# Patient Record
Sex: MALE | Race: WHITE | Employment: OTHER | URBAN - METROPOLITAN AREA
[De-identification: names, ages, dates, MRNs, and addresses within clinical notes are randomized per-mention and may not be internally consistent; named-entity substitution may affect disease eponyms.]

---

## 2017-06-01 ENCOUNTER — PRE VISIT (OUTPATIENT)
Dept: UROLOGY | Facility: CLINIC | Age: 52
End: 2017-06-01

## 2017-06-05 ENCOUNTER — OFFICE VISIT (OUTPATIENT)
Dept: UROLOGY | Facility: CLINIC | Age: 52
End: 2017-06-05
Payer: COMMERCIAL

## 2017-06-05 VITALS — WEIGHT: 174.7 LBS

## 2017-06-05 DIAGNOSIS — N50.819 EPIDIDYMAL PAIN: Primary | ICD-10-CM

## 2017-06-05 PROCEDURE — 99243 OFF/OP CNSLTJ NEW/EST LOW 30: CPT | Performed by: UROLOGY

## 2017-06-05 RX ORDER — TRAZODONE HYDROCHLORIDE 50 MG/1
50 TABLET, FILM COATED ORAL
COMMUNITY
Start: 2017-05-05

## 2017-06-05 RX ORDER — CIPROFLOXACIN 500 MG/1
TABLET, FILM COATED ORAL
Refills: 0 | COMMUNITY
Start: 2017-05-17

## 2017-06-05 RX ORDER — TAMSULOSIN HYDROCHLORIDE 0.4 MG/1
0.4 CAPSULE ORAL
COMMUNITY
Start: 2010-03-09

## 2017-06-05 RX ORDER — ATORVASTATIN CALCIUM 20 MG/1
20 TABLET, FILM COATED ORAL
COMMUNITY
Start: 2017-03-12

## 2017-06-05 RX ORDER — PANTOPRAZOLE SODIUM 20 MG/1
20 TABLET, DELAYED RELEASE ORAL DAILY
Refills: 11 | COMMUNITY
Start: 2017-05-24

## 2017-06-05 ASSESSMENT — PAIN SCALES - GENERAL: PAINLEVEL: NO PAIN (0)

## 2017-06-05 NOTE — MR AVS SNAPSHOT
Patient's Medication List   [facility] in [place]       Patient:  JENIFER HAYS   :  November 15, 1965   Physician:  Lukasz Ibarra           This is your record.  Keep this with you and show to your community pharmacist(s) and physician(s) at each visit.     Allergies:  FENTANYL - Nausea and Vomiting               Medications  Valid as of: 2017 -  2:27 PM    Generic Name Brand Name Tablet Size Instructions for use    Atorvastatin Calcium LIPITOR 20 MG Take 20 mg by mouth        Ciprofloxacin HCl CIPRO 500 MG TAKE 1 TABLET BY MOUTH TWICE DAILY FOR 14 DAYS        Pantoprazole Sodium PROTONIX 20 MG Take 20 mg by mouth daily        Tamsulosin HCl FLOMAX 0.4 MG Take 0.4 mg by mouth        TraZODone HCl DESYREL 50 MG Take 50 mg by mouth        .           .           .           .

## 2017-06-05 NOTE — NURSING NOTE
Maurice Mahan's goals for this visit include:   Chief Complaint   Patient presents with     Consult     left side testicular pain- dull ache        He requests these members of his care team be copied on today's visit information: yes    PCP: Lukasz Ibarra    Referring Provider:  Kye Burnett MD  [facility]  [address] [place] [address]    Chief Complaint   Patient presents with     Consult     left side testicular pain- dull ache        Initial Wt 79.2 kg (174 lb 11.2 oz) There is no height or weight on file to calculate BMI.  Medication Reconciliation: complete    Do you need any medication refills at today's visit? No     Amorrae JAMA Brooks

## 2017-06-05 NOTE — PROGRESS NOTES
I am seeing Maurice Mahan in consultation from Kye Burnett  for evaluation of left epididymalgia.    HPI:  Maurice Mahan is a 51 year old male with chronic left epididymal pain.    History of resolution of discomfort after Cipro.    Has improvement with Cipro, takes pretty constant x 5-6 months.  Pain comes and goes.  Worse with exercise.  Doesn't affect sexual function.    No history of vasectomy.    UA was crystal clear 2017.    Scrotal ultrasound 10/10/11  Tiny left epididymis cyst  Flow to the bilateral testicles is normal and symmetrical.    REVIEW OF SYSTEMS:  General: negative  Skin: negative  Eyes: negative  Ears/Nose/Throat: negative  Respiratory: negative  Cardiovascular: negative  Gastrointestinal: GERD after esophagus testis.  Genitourinary: see HPI  Musculoskeletal: negative  Neurologic: negative  Psychiatric: negative  Hematologic/Lymphatic/Immunologic: negative  Endocrine: negative    PAST MEDICAL HX:  Take lipitor  Flomax for BPH  History of esophageal cancer, GERD now  Insomina.  History of HSV- lip only.    PAST SURG HX:  Surgery for right torsion  esophagectomy    FAMILY HX:  Father CVA, .  Ethoic.  Mother has ANCA-vasculitis, COPD, neuropathy.    SOCIAL HX:  Social History   Substance Use Topics     Smoking status: Not on file     Smokeless tobacco: Not on file     Alcohol use Not on file     MEDICATIONS:  Current Outpatient Prescriptions   Medication Sig     atorvastatin (LIPITOR) 20 MG tablet Take 20 mg by mouth     tamsulosin (FLOMAX) 0.4 MG capsule Take 0.4 mg by mouth     traZODone (DESYREL) 50 MG tablet Take 50 mg by mouth     ciprofloxacin (CIPRO) 500 MG tablet TAKE 1 TABLET BY MOUTH TWICE DAILY FOR 14 DAYS     pantoprazole (PROTONIX) 20 MG EC tablet Take 20 mg by mouth daily     No current facility-administered medications for this visit.        ALLERGIES:  Fentanyl      GENERAL PHYSICAL EXAM:     Wt 79.2 kg (174 lb 11.2 oz)   Constitutional: No acute distress.  Well nourished.   PSYCH: normal mood and affect.  NEURO: normal gait, no focal deficits.   EYES: anicteric, EOMI, PERR.  ENT: neck supple, no lymphadenopathy, mucosae moist, no thrush.  CARDIOPULMONARY: breathing non-labored, pulse regular rate/rhythm, no peripheral edema.  GI: Abdomen soft, non-tender, overweight, no organomegaly.  MUSCULOSKELETAL: normal limb proportions, no muscle wasting, no contractures.  SKIN: Normal virilized hair distribution, no lesions, warts or rashes over genitalia, abdomen extremities or face.  HEME/LYMPH: no ecchymosis, no lymphadenopathy in groin or neck, no lymphedema.     EXAM:  Phallus normal , meatus adequate, no plaques palpated.   Left testis descended, size is normal  , consistency is normal . No intra-testicular masses.   Right testis descended, size is normal , consistency is normal . No intra-testicular masses.   Epididymes present, right is non-tender, not-enlarged. Left epididymis slightly tender to palpation.  Left cord: Vas present. no varicocele noted.  Right cord: Vas present. no varicocele noted.     Prostate exam: deferred     Imaging/labs:    ASSESSMENT:     Left epididymalgia, chronic.  Discussed this could be idiopathic, possible congestive epididymitis/ obstruction.    PLAN:    Discussed observation versus epididymectomy versus microscopic denervation of the spermatic cord, versus orchiectomy.    Pt is not bothered enough to pursue surgery.    Advised follow-up as needed.  No strong evidence of infection, but Cipro does help the discomfort.  It's not clear if this is because of infection or from the anti-inflammatory properties of Cipro.    Follow-up if pain worsens enough to want surgery.    Discussed could do Bacterial DNA analysis of semen/urine to check on treatment options for other antibiotics. But the yield from this is probably questionable.      Copied cc to Consulting provider Kye Burnett        Thank-you for the kind consultation.  Vimal Pina,  MD     Urological Surgeon

## 2017-06-05 NOTE — LETTER
2017      RE: Maurice Mahan  [address]  [place] [address]       I am seeing Maurice Mahan in consultation from Kye Burnett  for evaluation of left epididymalgia.    HPI:  Maurice Mahan is a 51 year old male with chronic left epididymal pain.    History of resolution of discomfort after Cipro.    Has improvement with Cipro, takes pretty constant x 5-6 months.  Pain comes and goes.  Worse with exercise.  Doesn't affect sexual function.    No history of vasectomy.    UA was crystal clear 2017.    REVIEW OF SYSTEMS:  General: negative  Skin: negative  Eyes: negative  Ears/Nose/Throat: negative  Respiratory: negative  Cardiovascular: negative  Gastrointestinal: GERD after esophagus testis.  Genitourinary: see HPI  Musculoskeletal: negative  Neurologic: negative  Psychiatric: negative  Hematologic/Lymphatic/Immunologic: negative  Endocrine: negative    PAST MEDICAL HX:  Take lipitor  Flomax for BPH  History of esophageal cancer, GERD now  Insomina.  History of HSV- lip only.    PAST SURG HX:  Surgery for right torsion  esophagectomy    FAMILY HX:  Father CVA, .  Ethoic.  Mother has ANCA-vasculitis, COPD, neuropathy.    SOCIAL HX:  Social History   Substance Use Topics     Smoking status: Not on file     Smokeless tobacco: Not on file     Alcohol use Not on file     MEDICATIONS:  Current Outpatient Prescriptions   Medication Sig     atorvastatin (LIPITOR) 20 MG tablet Take 20 mg by mouth     tamsulosin (FLOMAX) 0.4 MG capsule Take 0.4 mg by mouth     traZODone (DESYREL) 50 MG tablet Take 50 mg by mouth     ciprofloxacin (CIPRO) 500 MG tablet TAKE 1 TABLET BY MOUTH TWICE DAILY FOR 14 DAYS     pantoprazole (PROTONIX) 20 MG EC tablet Take 20 mg by mouth daily     No current facility-administered medications for this visit.        ALLERGIES:  Fentanyl      GENERAL PHYSICAL EXAM:     Wt 79.2 kg (174 lb 11.2 oz)   Constitutional: No acute distress. Well nourished.   PSYCH: normal  mood and affect.  NEURO: normal gait, no focal deficits.   EYES: anicteric, EOMI, PERR.  ENT: neck supple, no lymphadenopathy, mucosae moist, no thrush.  CARDIOPULMONARY: breathing non-labored, pulse regular rate/rhythm, no peripheral edema.  GI: Abdomen soft, non-tender, overweight, no organomegaly.  MUSCULOSKELETAL: normal limb proportions, no muscle wasting, no contractures.  SKIN: Normal virilized hair distribution, no lesions, warts or rashes over genitalia, abdomen extremities or face.  HEME/LYMPH: no ecchymosis, no lymphadenopathy in groin or neck, no lymphedema.     EXAM:  Phallus normal , meatus adequate, no plaques palpated.   Left testis descended, size is normal  , consistency is normal . No intra-testicular masses.   Right testis descended, size is normal , consistency is normal . No intra-testicular masses.   Epididymes present, right is non-tender, not-enlarged. Left epididymis slightly tender to palpation.  Left cord: Vas present. no varicocele noted.  Right cord: Vas present. no varicocele noted.     Prostate exam: deferred     Imaging/labs:    ASSESSMENT:     Left epididymalgia, chronic.  Discussed this could be idiopathic, possible congestive epididymitis/ obstruction.    PLAN:    Discussed observation versus epididymectomy versus microscopic denervation of the spermatic cord, versus orchiectomy.    Pt is not bothered enough to pursue surgery.    Advised follow-up as needed.  No strong evidence of infection, but Cipro does help the discomfort.  It's not clear if this is because of infection or from the anti-inflammatory properties of Cipro.    Follow-up if pain worsens enough to want surgery.    Discussed could do Bacterial DNA analysis of semen/urine to check on treatment options for other antibiotics. But the yield from this is probably questionable.      Copied cc to Consulting provider Kye Burnett        Thank-you for the kind consultation.  Vimal Pina MD     Urological  Surgeon    Vimal Pina MD

## 2017-06-05 NOTE — MR AVS SNAPSHOT
Maurice Mahan     (MR # [id number])              After Visit Summary      Visit Information     Date & Time Provider Department Encounter #    6/5/2017  4:00 PM Vimal Pina MD [facility] [id number]      Vitals  Most recent update: 6/5/2017  3:49 PM by Maricel Brooks MA    Wt                   79.2 kg (174 lb 11.2 oz)           Reason for visit     Consult left side testicular pain- dull ache     Reason for Visit History      Diagnoses        Codes Comments    Epididymal pain    -  Primary N50.819       Appointments for Next 1 Year     None      Follow-up and Disposition     Return if symptoms worsen or fail to improve.    Follow-up and Disposition History      Health Maintenance Due     Health Maintenance Due   Topic Date Due    TETANUS IMMUNIZATION (SYSTEM ASSIGNED)  11/15/1983    HEPATITIS C SCREENING  11/15/1983    LIPID SCREEN Q5 YR MALE (SYSTEM ASSIGNED)  11/15/2000    COLON CANCER SCREEN (SYSTEM ASSIGNED)  11/15/2015              IMPORTANT INFORMATION  For all doctor appointments, please bring your medications in their original containers .   For all prescription refills please contact your pharmacy directly one week prior to your last dose and request a refill. The pharmacy will then contact us.  If you have any lab tests ordered please call [phone] to schedule a lab appointment.  To contact [facility] in [facility] call [phone] / for [facility] in [facility] call [phone] / for [facility] in [facility] call [phone].    Pharmacy: [facility] [id number] [address]    * * * * * * * PATIENT'S COPY* * * * * * * * * *

## 2017-06-12 ENCOUNTER — TELEPHONE (OUTPATIENT)
Dept: UROLOGY | Facility: CLINIC | Age: 52
End: 2017-06-12

## 2017-06-12 DIAGNOSIS — N50.819 EPIDIDYMAL PAIN: Primary | ICD-10-CM

## 2017-06-12 RX ORDER — CIPROFLOXACIN 500 MG/1
500 TABLET, FILM COATED ORAL 2 TIMES DAILY
Qty: 28 TABLET | Refills: 0 | Status: SHIPPED | OUTPATIENT
Start: 2017-06-12 | End: 2017-06-26

## 2017-06-12 NOTE — TELEPHONE ENCOUNTER
"Discussed message below with Dr. Pina who recommends for patient to stop the cipro for now and if symptoms persist, patient should follow up in clinic to discuss other options.     Called and spoke to patient who is aware of the above information. Patient stated, \"I have been off of the cipro now for 4 days and I feel like it helps some, but not completely.\" Patient requesting for a refill of cipro to be sent to [facility] in [place] if possible. Patient will call with any questions.    Discussed with Dr. Pina and refill for ciprofloxacin 500 mg PO BID for 2 weeks received. Prescription sent to [facility] in [place] per patient request.      Verna Suarez RN, BSN  -[facility]  Urology/General Surgery      "

## 2017-06-12 NOTE — TELEPHONE ENCOUNTER
Received voicemail from patient requesting a different antibiotic for epididymitis. Patient requested call back to discuss further.    Returned call to patient and left generic voicemail requesting a return call back to clinic.     Verna Suarez RN, BSN  [facility]  Urology/General Surgery

## 2017-07-05 ENCOUNTER — TELEPHONE (OUTPATIENT)
Dept: UROLOGY | Facility: CLINIC | Age: 52
End: 2017-07-05

## 2017-07-05 NOTE — TELEPHONE ENCOUNTER
Received a fax from [facility] requesting a refill on Cipro per Dr. Oilver last documentation:     Dr. Pina who recommends for patient to stop the cipro for now and if symptoms persist, patient should follow up in clinic to discuss other options.     Pt was informed of this . Pt states he is unsure when he will be able to be seen in clinic and will call back when he is available.

## 2018-04-19 ENCOUNTER — RECORDS - HEALTHEAST (OUTPATIENT)
Dept: ADMINISTRATIVE | Facility: OTHER | Age: 53
End: 2018-04-19

## 2018-04-20 ENCOUNTER — HOSPITAL ENCOUNTER (OUTPATIENT)
Dept: INTERVENTIONAL RADIOLOGY/VASCULAR | Facility: HOSPITAL | Age: 53
Setting detail: RADIATION/ONCOLOGY SERIES
Discharge: STILL ADMITTED OR ADMINISTRATIVE | End: 2018-04-20

## 2018-04-20 DIAGNOSIS — C15.9 ESOPHAGEAL CANCER (H): ICD-10-CM

## 2018-04-20 LAB
HGB BLD-MCNC: 14.2 G/DL (ref 14–18)
INR PPP: 0.93 (ref 0.9–1.1)
PLATELET # BLD AUTO: 346 THOU/UL (ref 140–440)

## 2018-04-20 ASSESSMENT — MIFFLIN-ST. JEOR: SCORE: 1648.65

## 2018-06-20 ASSESSMENT — MIFFLIN-ST. JEOR: SCORE: 1604.2

## 2018-06-22 ENCOUNTER — ANESTHESIA - HEALTHEAST (OUTPATIENT)
Dept: SURGERY | Facility: HOSPITAL | Age: 53
End: 2018-06-22

## 2018-06-22 ENCOUNTER — SURGERY - HEALTHEAST (OUTPATIENT)
Dept: SURGERY | Facility: HOSPITAL | Age: 53
End: 2018-06-22

## 2018-07-06 ENCOUNTER — OFFICE VISIT - HEALTHEAST (OUTPATIENT)
Dept: SURGERY | Facility: CLINIC | Age: 53
End: 2018-07-06

## 2018-07-06 DIAGNOSIS — Z48.89 POSTOPERATIVE VISIT: ICD-10-CM

## 2018-08-07 ENCOUNTER — RECORDS - HEALTHEAST (OUTPATIENT)
Dept: ADMINISTRATIVE | Facility: OTHER | Age: 53
End: 2018-08-07

## 2018-08-08 ENCOUNTER — HOSPITAL ENCOUNTER (OUTPATIENT)
Dept: ULTRASOUND IMAGING | Facility: HOSPITAL | Age: 53
Discharge: HOME | End: 2018-08-08
Attending: INTERNAL MEDICINE | Admitting: RADIOLOGY

## 2018-08-08 DIAGNOSIS — C15.3: ICD-10-CM

## 2018-08-11 LAB
BACTERIA SPEC CULT: NO GROWTH
GRAM STAIN RESULT: NORMAL
GRAM STAIN RESULT: NORMAL

## 2018-09-19 ENCOUNTER — OFFICE VISIT - HEALTHEAST (OUTPATIENT)
Dept: SURGERY | Facility: CLINIC | Age: 53
End: 2018-09-19

## 2018-09-19 DIAGNOSIS — C15.9 MALIGNANT NEOPLASM OF ESOPHAGUS, UNSPECIFIED LOCATION (H): ICD-10-CM

## 2018-09-19 ASSESSMENT — MIFFLIN-ST. JEOR: SCORE: 1501.69

## 2018-11-28 ENCOUNTER — OFFICE VISIT - HEALTHEAST (OUTPATIENT)
Dept: SURGERY | Facility: CLINIC | Age: 53
End: 2018-11-28

## 2018-11-28 DIAGNOSIS — C15.5 MALIGNANT NEOPLASM OF LOWER THIRD OF ESOPHAGUS (H): ICD-10-CM

## 2018-11-28 ASSESSMENT — MIFFLIN-ST. JEOR: SCORE: 1548.86

## 2019-01-01 ENCOUNTER — HOSPITAL ENCOUNTER (OUTPATIENT)
Dept: RADIOLOGY | Facility: HOSPITAL | Age: 54
Discharge: HOME | End: 2019-08-28
Attending: INTERNAL MEDICINE

## 2019-01-01 ENCOUNTER — RECORDS - HEALTHEAST (OUTPATIENT)
Dept: ADMINISTRATIVE | Facility: OTHER | Age: 54
End: 2019-01-01

## 2019-01-01 ENCOUNTER — TRANSFERRED RECORDS (OUTPATIENT)
Dept: HEALTH INFORMATION MANAGEMENT | Facility: CLINIC | Age: 54
End: 2019-01-01

## 2019-01-01 ENCOUNTER — HEALTH MAINTENANCE LETTER (OUTPATIENT)
Age: 54
End: 2019-01-01

## 2019-01-01 DIAGNOSIS — C15.9 ESOPHAGEAL CANCER (H): ICD-10-CM

## 2020-01-01 ENCOUNTER — VIRTUAL VISIT (OUTPATIENT)
Dept: ONCOLOGY | Facility: CLINIC | Age: 55
End: 2020-01-01
Attending: PHYSICIAN ASSISTANT
Payer: COMMERCIAL

## 2020-01-01 ENCOUNTER — PRE VISIT (OUTPATIENT)
Dept: ONCOLOGY | Facility: CLINIC | Age: 55
End: 2020-01-01

## 2020-01-01 ENCOUNTER — ONCOLOGY VISIT (OUTPATIENT)
Dept: ONCOLOGY | Facility: CLINIC | Age: 55
End: 2020-01-01
Attending: INTERNAL MEDICINE
Payer: COMMERCIAL

## 2020-01-01 VITALS
SYSTOLIC BLOOD PRESSURE: 110 MMHG | OXYGEN SATURATION: 100 % | HEART RATE: 93 BPM | DIASTOLIC BLOOD PRESSURE: 73 MMHG | TEMPERATURE: 98 F | WEIGHT: 145.1 LBS

## 2020-01-01 DIAGNOSIS — C15.5 MALIGNANT NEOPLASM OF LOWER THIRD OF ESOPHAGUS (H): Primary | ICD-10-CM

## 2020-01-01 DIAGNOSIS — C16.0 GASTROESOPHAGEAL CANCER (H): Primary | ICD-10-CM

## 2020-01-01 DIAGNOSIS — M89.8X9 MALIGNANT BONE PAIN: ICD-10-CM

## 2020-01-01 DIAGNOSIS — G89.3 MALIGNANT BONE PAIN: ICD-10-CM

## 2020-01-01 DIAGNOSIS — R64 CACHEXIA (H): ICD-10-CM

## 2020-01-01 DIAGNOSIS — C79.51 BONE METASTASIS: ICD-10-CM

## 2020-01-01 LAB
COPATH REPORT: NORMAL
LAB AP CHARGES (HE HISTORICAL CONVERSION): ABNORMAL
LAB MED GENERAL PATH INTERP (HE HISTORICAL CONVERSION): ABNORMAL
PATH REPORT.ADDENDUM SPEC: ABNORMAL
PATH REPORT.COMMENTS IMP SPEC: ABNORMAL
PATH REPORT.FINAL DX SPEC: ABNORMAL
PATH REPORT.MICROSCOPIC SPEC OTHER STN: ABNORMAL
PATH REPORT.RELEVANT HX SPEC: ABNORMAL
RESULT FLAG (HE HISTORICAL CONVERSION): ABNORMAL
SPECIMEN DESCRIPTION: ABNORMAL

## 2020-01-01 PROCEDURE — 99215 OFFICE O/P EST HI 40 MIN: CPT | Mod: 95 | Performed by: INTERNAL MEDICINE

## 2020-01-01 PROCEDURE — 40000114 ZZH STATISTIC NO CHARGE CLINIC VISIT

## 2020-01-01 PROCEDURE — 00000346 ZZHCL STATISTIC REVIEW OUTSIDE SLIDES TC 88321: Performed by: INTERNAL MEDICINE

## 2020-01-01 PROCEDURE — 99204 OFFICE O/P NEW MOD 45 MIN: CPT | Mod: ZP | Performed by: INTERNAL MEDICINE

## 2020-01-01 RX ORDER — METOPROLOL SUCCINATE 25 MG/1
25 TABLET, EXTENDED RELEASE ORAL
COMMUNITY
Start: 2018-10-12

## 2020-01-01 RX ORDER — FERROUS SULFATE 324(65)MG
324 TABLET, DELAYED RELEASE (ENTERIC COATED) ORAL
COMMUNITY

## 2020-01-01 RX ORDER — METOPROLOL SUCCINATE 25 MG/1
TABLET, EXTENDED RELEASE ORAL
COMMUNITY
Start: 2019-04-17

## 2020-01-01 RX ORDER — LORAZEPAM 1 MG/1
TABLET ORAL
COMMUNITY
Start: 2019-01-01

## 2020-01-01 RX ORDER — SENNOSIDES A AND B 8.6 MG/1
8.6 TABLET, FILM COATED ORAL
COMMUNITY
Start: 2019-01-01

## 2020-01-01 ASSESSMENT — PAIN SCALES - GENERAL: PAINLEVEL: MILD PAIN (2)

## 2020-01-10 NOTE — TELEPHONE ENCOUNTER
RECORDS STATUS - ALL OTHER DIAGNOSIS      Action    Action Taken January 10, 2020  LVM for PT to CB and verify records Hx.  PT needs to sign and GEORGE for [facility] and authorize us to view [facility] in [place].  January 13, 2020  Signed GEORGE received from PT and faxed with request for records to [facility]  January 28, 2020  IB from Dr Cook requesting last two office notes from Dr. Sanchez.  Call to Sweetie at [facility] and she is faxing over ASAP     RECORDS RECEIV[facility]: [facility], [facility], [facility], [facility], [facility],/[facility]   DATE RECEIVED: Care Everywhere/Requested 1/13/20   NOTES STATUS DETAILS   OFFICE NOTE from referring provider     OFFICE NOTE from medical oncologist Received 1/13/20   297 pages  [facility]   DISCHARGE SUMMARY from hospital     DISCHARGE REPORT from the ER     OPERATIVE REPORT     MEDICATION LIST     CLINICAL TRIAL TREATMENTS TO DATE     LABS Received 1/13/20    PATHOLOGY REPORTS     ANYTHING RELATED TO DIAGNOSIS Slides requested 1/10/20  Received [facility] 1/15/20  Received [facility] 1/15/20  Received [facility] 1/15/20  Received [facility] 1/15/20 [facility] Case: [id number]; Case: [id number]   [facility] Case: [id number]   [facility] Case: [id number]   [facility] NR- (A)   GENONOMIC TESTING     TYPE:     IMAGING (NEED IMAGES & REPORT)     CT SCANS Requested 1/10/20  Verified in PACS 1/13/20  Requested 1/14/20  Received [facility] 1/22/20      Merged to PACS 1/15/20 [facility]    [facility]  [facility] (Sending disc per Rogers)  CDI   MRI Requested 1/10/20  Verified in PACS 1/13/20  Requested 1/14/20  Merged to PACS 1/15/20   [facility]    [facility]   [facility] whole body Requested 1/10/20  Verified in PACS 1/13/20 [facility]   ULTRASOUND     PET Requested 1/14/20  Merged to PACS 1/15/20 [facility]

## 2020-01-10 NOTE — TELEPHONE ENCOUNTER
ONCOLOGY INTAKE: Records Information      APPT INFORMATION: 1/21/20 - Joey - [facility]   Referring provider:  self referred  Referring provider s clinic:  none  Reason for visit/diagnosis:  2nd opinion esophageal cancer  Has patient been notified of appointment date and time?: yes    RECORDS INFORMATION:  Were the records received with the referral (via [employer])? no    Has patient been seen for any external appt for this diagnosis? yes    If yes, where? [facility] Pending sale to [facility], [facility], [place], [place], [place]  [facility]    Has patient had any imaging or procedures outside of [place] for this condition? yes      If Yes, where? [facility] Pending sale to [facility], [facility], [place], [place], [place]  [facility]    ADDITIONAL INFORMATION:  Scheduled via call from patient

## 2020-01-21 NOTE — LETTER
1/21/2020       RE: Maurice Mahan  [address]  [place] [address]     Dear Colleague,    Thank you for referring your patient, Maurice Mahan, to the [facility]. Please see a copy of my visit note below.    [facility]  [facility]CER [facility]    NEW PATIENT VISIT NOTE    Referring Provider: Dr. Wilson at [facility] in [place]    PATIENT NAME: Maurice Mahan MRN # [id number]  DATE OF VISIT: January 20, 2020 YOB: 1965    CANCER TYPE: Esophageal/GE junction adeno  STAGE: Metastatic. Initially [id number] in 2011  ECOG PS: 1    Cancer Staging  No matching staging information was found for the patient.    PD-L1: Pending. Was 0% on 2011 specimen  NGS:    SUMMARY    6/8/11 EUS  6/2011 PET/CT  6/9/11 [place] (Dr. Lewis at [facility])  6/13~7/11/11 Chemoradiation with cis + 5FU x 2 cycles  9/1/11 Robotic esophagectomy, jejunostomy, Complete pathologic CR  4/6/18 Esophageal anastamosis/paraesophageal node bx +fariba for adenocarcinoma. Had presented again with dysphagia. PET/CT at the time negative except for persistent paraesophageal node  5~7/16/18 Chemoradiation with cisplatin + 5FU x 4 cycles. Had feeding tube.  8/8/18 L thoracentesis 700 cc at [facility]. Cytology negative for malignancy but felt to be be malignant.   8/15/18 LLE US DVT. Enoxaparin --> rivaroxaban. Persistent in 9/2018 11/27/18 EUS  2/20/19 Bone scan showed C2 lesion  2/25/19 C2 biopsy. Adenocarcinoma, HER-2 IHC negative  4/2/19 MRI @ [facility]. C2, C4, C5, T2 lesions  4/5/19 C1-4 fusion, C2 debulking due to instability of Cspein. Path: adenocarcinoma with signet ring features. HER2 IHC negative  4/24~7/3/19 FOLFIRI x 6  8/5~??? Ramucirumab  10/30/19 Cyberknife to C5 over 3 fractions 10/30 ~ 11/1 12/7/19~current Trifluridine/tipiracil  12/4/19 T6 biopsy  12/18/19 T9 biopsy    SUBJECTIVE  Mr. Mahan is a 55 yo male who presents today for a second opinion and to consider the FATE  trial for metastatic esophageal/GE junction adenocarcinoma. He is accompanied by his wife. Currently on lonsurf since 12/7/19. Has ongoing shortness of breath that no one has been really able to explain. Has seen cardiology and pulmonary for this. No problems at rest but exercise tolerance is limited and has very low stamina. Is going to start an exercise program for people living with cancer next week. Pulmonary suspected it to be at least somewhat related to reflux and aspiration, noting that this was his opinion despite the patient sleeping upright. He has a known small left pleural effusion that has been stable and required thoracentesis 8/8/2018, but nothing since. Had some R rib pain after sex felt to perhaps be a fracture last month, resolving. Has chronic neck pain but fairly mild, but limited ROM after surgery. No numbness/tingling, fevers, chills, N/V, dysphagia. Appetite ok. Bowel movements/urination ok. No leg swelling. Otherwise doing ok.     Has another spine MRI and restaging CT CAP coming up 2/2/2020. Hasn't had a CT for a long while    PAST MEDICAL HISTORY  Esophageal cancer as above  H/o LLE DVT 8/15/18 on rivaroxaban  IVC filter. Removed 7/15/19  H/o mild chemo-induced cardiomyopathy, EF ~ 45% since at least 2018. Possible constrictive pericarditis based on MRI. Notes indicate possible radiation-induced pericarditis in 2018  REMY  Bladder outlet obstruction  H/o AV block  Dyslipidemia  H/o SBO in summer 2018.  MDD  Herniated disk 2008  H/o ETOH, none since 1996  Spermatocele excision 2010    CURRENT OUTPATIENT MEDICATIONS  Current Outpatient Medications   Medication Sig Dispense Refill     atorvastatin (LIPITOR) 20 MG tablet Take 20 mg by mouth       Ferrous Sulfate 324 (65 Fe) MG TBEC Take 324 mg by mouth       LORazepam (ATIVAN) 1 MG tablet TAKE 1 TABLET BY MOUTH EVERY 4 HOURS AS NEEDED FOR ANXIETY, SLEEP, OR NAUSEA       metoprolol succinate ER (TOPROL-XL) 25 MG 24 hr tablet Take 25 mg by mouth        metoprolol succinate ER (TOPROL-XL) 25 MG 24 hr tablet        pantoprazole (PROTONIX) 20 MG EC tablet Take 20 mg by mouth daily  11     senna (SENNA-TIME) 8.6 MG tablet Take 8.6 mg by mouth       tamsulosin (FLOMAX) 0.4 MG capsule Take 0.4 mg by mouth       ciprofloxacin (CIPRO) 500 MG tablet TAKE 1 TABLET BY MOUTH TWICE DAILY FOR 14 DAYS  0     traZODone (DESYREL) 50 MG tablet Take 50 mg by mouth       ALLERGIES  Allergies   Allergen Reactions     Fentanyl Nausea and Vomiting     Reports getting very sick     REVIEW OF SYSTEMS  As above in the HPI, o/w complete 12-point ROS was negative.    SOCIAL HISTORY: . 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 Used to also work as a microbiologist. Former smoker, only about 7 PY, quit in about 2002. H/o ETOH dependence, none since 1996. Used to run regularly, 3-5 miles 4-5 times per week. Two stepsons 14 and 21.    FAMILY HISTORY: Mother had heart failure     PHYSICAL EXAM  /73   Pulse 93   Temp 98  F (36.7  C) (Oral)   Wt 65.8 kg (145 lb 1.6 oz)   SpO2 100%   Wt Readings from Last 3 Encounters:   06/05/17 79.2 kg (174 lb 11.2 oz)     GEN: NAD    LABORATORY AND IMAGING STUDIES  No labs done here today but outside labs reviewed.     CEAs  10/25/19 5.10  11/22/19 4.30  12/6/19 4.40    CA 19-9  97.5    Outside imaging was personally reviewed, including spine MRIs 11/25/19 and CT neck 12/18/19 available in Care Everywhere and PACS.    10/28/19 TTE. EF 40-45%, generalized hypokinesis, normal RV, LA. Mild AR. No change since 6/10/19    ASSESSMENT AND PLAN  Metastatic esophageal/GE junction adenocarcinoma, signet ring features, PD-L1 pending, HER-2 IHC negative: He came specifically to discuss the FATE trial. We reviewed the logistics of the trial and experience to date with prior NK cell trials for solid tumors, although the current trial is a bit newer. He had done some research on  it already and is aware that it is an off-the-shelf product, etc.. There are no slots right now. I updated him after our visit; there is an amendment going through and so there potentially will not be slots for 2-3 months. Magdalena Flores, the nurse for the study, is aware of him. I also reached out to my GI colleagues. Dr. Terrell has a TIL trial that involves CRISPR coming up soon. We discussed that the timing of a trial opening, slots being available, etc., is very difficult to predict accurately. Currently on Lonsurf and tolerating well. Has restaging scans 2/2. It'll be important because right now he doesn't likely have measurable disease by RECIST. It seems the only lesions are the spine ones, which cannot be target lesions. Also after our visit, I recommended seeing Dr. Terrell in about 5-6 weeks to further discuss the TIL trial and see if there's a slot for the FATE trial yet. Dr. Wilson has been trying to get PD-L1 status off of recent bone biopsies, which have been unsuccessful. Here at the , PD-L1 cannot be done usually on decalcified bone specimens. We discussed potential options as pembro seems to be a natural next step if/when Lonsurf stops working. We talked about the concept of tumor heterogeneity, and therefore one could potentially be justified in trying to get pembro off label. I explained at least for lung cancer, insurance often does not have much of a problem covering it. If not covered, they can pursue obtaining drug from the company. I asked him to discuss this with his team at [facility] as the process can take a while.     No follow up with me, but will schedule appt with Dr. Terrell as above.     A total of 45 minutes was spent with the patient, >50% of which was spent in counseling and coordination of care. I additionally spent 30 minutes obtaining/reviewing records and outside imaging.     Taylor Cook MD    Hematology, Oncology and Transplantation

## 2020-01-21 NOTE — PROGRESS NOTES
[facility] CANCER CLINIC    NEW PATIENT VISIT NOTE    Referring Provider: Dr. Wilson at [facility] in [place]    PATIENT NAME: Maurice Mahan MRN # [id number]  DATE OF VISIT: January 20, 2020 YOB: 1965    CANCER TYPE: Esophageal/GE junction adeno  STAGE: Metastatic. Initially [id number] in 2011  ECOG PS: 1    Cancer Staging  No matching staging information was found for the patient.    PD-L1: Pending. Was 0% on 2011 specimen  NGS:    SUMMARY    6/8/11 EUS  6/2011 PET/CT  6/9/11 Port (Dr. Lewis at [facility])  6/13~7/11/11 Chemoradiation with cis + 5FU x 2 cycles  9/1/11 Robotic esophagectomy, jejunostomy, Complete pathologic CR  4/6/18 Esophageal anastamosis/paraesophageal node bx +fariba for adenocarcinoma. Had presented again with dysphagia. PET/CT at the time negative except for persistent paraesophageal node  5~7/16/18 Chemoradiation with cisplatin + 5FU x 4 cycles. Had feeding tube.  8/8/18 L thoracentesis 700 cc at [facility]. Cytology negative for malignancy but felt to be be malignant.   8/15/18 LLE US DVT. Enoxaparin --> rivaroxaban. Persistent in 9/2018 11/27/18 EUS  2/20/19 Bone scan showed C2 lesion  2/25/19 C2 biopsy. Adenocarcinoma, HER-2 IHC negative  4/2/19 MRI @ [facility]. C2, C4, C5, T2 lesions  4/5/19 C1-4 fusion, C2 debulking due to instability of Cspein. Path: adenocarcinoma with signet ring features. HER2 IHC negative  4/24~7/3/19 FOLFIRI x 6  8/5~??? Ramucirumab  10/30/19 Cyberknife to C5 over 3 fractions 10/30 ~ 11/1 12/7/19~current Trifluridine/tipiracil  12/4/19 T6 biopsy  12/18/19 T9 biopsy    SUBJECTIVE  Mr. Mahan is a 55 yo male who presents today for a second opinion and to consider the FATE trial for metastatic esophageal/GE junction adenocarcinoma. He is accompanied by his wife. Currently on lonsurf since 12/7/19. Has ongoing shortness of breath that no one has been really able to explain. Has seen cardiology and pulmonary for this. No  problems at rest but exercise tolerance is limited and has very low stamina. Is going to start an exercise program for people living with cancer next week. Pulmonary suspected it to be at least somewhat related to reflux and aspiration, noting that this was his opinion despite the patient sleeping upright. He has a known small left pleural effusion that has been stable and required thoracentesis 8/8/2018, but nothing since. Had some R rib pain after sex felt to perhaps be a fracture last month, resolving. Has chronic neck pain but fairly mild, but limited ROM after surgery. No numbness/tingling, fevers, chills, N/V, dysphagia. Appetite ok. Bowel movements/urination ok. No leg swelling. Otherwise doing ok.     Has another spine MRI and restaging CT CAP coming up 2/2/2020. Hasn't had a CT for a long while    PAST MEDICAL HISTORY  Esophageal cancer as above  H/o LLE DVT 8/15/18 on rivaroxaban  IVC filter. Removed 7/15/19  H/o mild chemo-induced cardiomyopathy, EF ~ 45% since at least 2018. Possible constrictive pericarditis based on MRI. Notes indicate possible radiation-induced pericarditis in 2018  REMY  Bladder outlet obstruction  H/o AV block  Dyslipidemia  H/o SBO in summer 2018.  MDD  Herniated disk 2008  H/o ETOH, none since 1996  Spermatocele excision 2010    CURRENT OUTPATIENT MEDICATIONS  Current Outpatient Medications   Medication Sig Dispense Refill     atorvastatin (LIPITOR) 20 MG tablet Take 20 mg by mouth       Ferrous Sulfate 324 (65 Fe) MG TBEC Take 324 mg by mouth       LORazepam (ATIVAN) 1 MG tablet TAKE 1 TABLET BY MOUTH EVERY 4 HOURS AS NEEDED FOR ANXIETY, SLEEP, OR NAUSEA       metoprolol succinate ER (TOPROL-XL) 25 MG 24 hr tablet Take 25 mg by mouth       metoprolol succinate ER (TOPROL-XL) 25 MG 24 hr tablet        pantoprazole (PROTONIX) 20 MG EC tablet Take 20 mg by mouth daily  11     senna (SENNA-TIME) 8.6 MG tablet Take 8.6 mg by mouth       tamsulosin (FLOMAX) 0.4 MG capsule Take 0.4 mg by  mouth       ciprofloxacin (CIPRO) 500 MG tablet TAKE 1 TABLET BY MOUTH TWICE DAILY FOR 14 DAYS  0     traZODone (DESYREL) 50 MG tablet Take 50 mg by mouth       ALLERGIES  Allergies   Allergen Reactions     Fentanyl Nausea and Vomiting     Reports getting very sick     REVIEW OF SYSTEMS  As above in the HPI, o/w complete 12-point ROS was negative.    SOCIAL HISTORY: . 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 Former smoker, only about 7 PY, quit in about 2002. H/o ETOH dependence, none since 1996. Used to run regularly, 3-5 miles 4-5 times per week. Two stepsons 14 and 21.    FAMILY HISTORY: Mother had heart failure     PHYSICAL EXAM  /73   Pulse 93   Temp 98  F (36.7  C) (Oral)   Wt 65.8 kg (145 lb 1.6 oz)   SpO2 100%   Wt Readings from Last 3 Encounters:   06/05/17 79.2 kg (174 lb 11.2 oz)     GEN: NAD    LABORATORY AND IMAGING STUDIES  No labs done here today but outside labs reviewed.     CEAs  10/25/19 5.10  11/22/19 4.30  12/6/19 4.40    CA 19-9  97.5    Outside imaging was personally reviewed, including spine MRIs 11/25/19 and CT neck 12/18/19 available in Care Everywhere and PACS.    10/28/19 TTE. EF 40-45%, generalized hypokinesis, normal RV, LA. Mild AR. No change since 6/10/19    ASSESSMENT AND PLAN  Metastatic esophageal/GE junction adenocarcinoma, signet ring features, PD-L1 pending, HER-2 IHC negative: He came specifically to discuss the FATE trial. We reviewed the logistics of the trial and experience to date with prior NK cell trials for solid tumors, although the current trial is a bit newer. He had done some research on it already and is aware that it is an off-the-shelf product, etc.. There are no slots right now. I updated him after our visit; there is an amendment going through and so there potentially will not be slots for 2-3 months. Magdalena Flores, the nurse for  the study, is aware of him. I also reached out to my GI colleagues. Dr. Terrell has a TIL trial that involves CRISPR coming up soon. We discussed that the timing of a trial opening, slots being available, etc., is very difficult to predict accurately. Currently on Lonsurf and tolerating well. Has restaging scans 2/2. It'll be important because right now he doesn't likely have measurable disease by RECIST. It seems the only lesions are the spine ones, which cannot be target lesions. Also after our visit, I recommended seeing Dr. Terrell in about 5-6 weeks to further discuss the TIL trial and see if there's a slot for the FATE trial yet. Dr. Wilson has been trying to get PD-L1 status off of recent bone biopsies, which have been unsuccessful. Here at the , PD-L1 cannot be done usually on decalcified bone specimens. We discussed potential options as pembro seems to be a natural next step if/when Lonsurf stops working. We talked about the concept of tumor heterogeneity, and therefore one could potentially be justified in trying to get pembro off label. I explained at least for lung cancer, insurance often does not have much of a problem covering it. If not covered, they can pursue obtaining drug from the company. I asked him to discuss this with his team at [facility] as the process can take a while.     No follow up with me, but will schedule appt with Dr. Terrell as above.     A total of 45 minutes was spent with the patient, >50% of which was spent in counseling and coordination of care. I additionally spent 30 minutes obtaining/reviewing records and outside imaging.     Taylor Cook MD    Hematology, Oncology and Transplantation

## 2020-02-07 NOTE — TELEPHONE ENCOUNTER
ONCOLOGY INTAKE: Records Information      APPT INFORMATION: 3/6/20 - [facility] - [facility]  Referring provider:  ARLENE Cook  Referring provider s clinic:  [facility]  Reason for visit/diagnosis:  2nd opinion esophageal cancer  Has patient been notified of appointment date and time?: Yes    RECORDS INFORMATION:  Were the records received with the referral (via [employer])? Internal referral    Has patient been seen for any external appt for this diagnosis? No    If yes, where? NA    Has patient had any imaging or procedures outside of [facility] for this condition? No      If Yes, where? NA    ADDITIONAL INFORMATION:  Scheduled via IB from Dr Cook  Pt confirmed

## 2020-05-01 NOTE — PROGRESS NOTES
"Maurice Mahan is a 54 year old male who is being evaluated via a billable video visit.      The patient has been notified of following:     \"This video visit will be conducted via a call between you and your physician/provider. We have found that certain health care needs can be provided without the need for an in-person physical exam.  This service lets us provide the care you need with a video conversation.  If a prescription is necessary we can send it directly to your pharmacy.  If lab work is needed we can place an order for that and you can then stop by our lab to have the test done at a later time.    Video visits are billed at different rates depending on your insurance coverage.  Please reach out to your insurance provider with any questions.    If during the course of the call the physician/provider feels a video visit is not appropriate, you will not be charged for this service.\"    Patient has given verbal consent for Video visit? Yes    How would you like to obtain your AVS? Cheo    Patient would like the video invitation sent by: Send to e-mail at: [email or web address]    Will anyone else be joining your video visit? No      Secondary Video Option (Doximity), send text message to:  [phone]    I have reviewed and updated the patient's allergies and medication list.    Concerns: Patient has no new concerns.      Refills: None      Reji Worthy, EMT      Video-Visit Details    Type of service:  Video Visit        Distant Location (provider location):  [facility] CANCER [facility]             "

## 2020-07-22 NOTE — TELEPHONE ENCOUNTER
ON TREATMENT  NOTE  RADIATION ONCOLOGY DEPARTMENT    Patient name:  Emily Barnes    Primary Physician:  Osei CABRERA M.D. MRN: [id number]  [facility]: [id number]   Referring physician:  Peter Foster M.D. : 1946, 74 y.o.     ENCOUNTER DATE:  20    DIAGNOSIS:  Small cell lung cancer (HCC)  Staging form: Lung, AJCC 8th Edition  - Clinical stage from 2020: Stage IIB (cT1c, cN1, cM0) - Signed by William Cruz M.D. on 2020  Type of lung cancer: Small cell lung cancer      TREATMENT SUMMARY:  Aria Treatment Information        Some values may be hidden. Unless noted otherwise, only the newest values recorded on each date are displayed.         Aria Treatment Summary 20   Course First Treatment Date 2020   Course Last Treatment Date 2020   R_Lung_Nodes Plan from Course C2_SCLC   Fraction 25 of 30   Elapsed Course Days    Prescribed Fraction Dose 150 cGy   Prescribed Total Dose 4,500 cGy   R_Lung_Nodes Reference Point from Course C2_SCLC   Elapsed Course Days    Session Dose 150 cGy   Total Dose 3,750 cGy   R_Lung_Nodes CP Reference Point from Course C2_SCLC   Elapsed Course Days    Session Dose 155 cGy   Total Dose 3,864 cGy             SUBJECTIVE:  Well appearing, taste changes no esophagitis    VITAL SIGNS:  KPS: 100, Fully active, able to carry on all pre-disease performed without restriction (ECOG equivalent 0)  Encounter Vitals  Temperature: 36.8 °C (98.2 °F)  Blood Pressure : 135/83  Pulse: 98  Pulse Oximetry: 95 %  Weight: 69.9 kg (154 lb 1.6 oz)  Pain Score: No pain  Pain Assessment 2020 2020 7/15/2020 7/10/2020   Pain Assessment Denies Pain - Denies Pain -   Pain Score 0 0 0 0   Some recent data might be hidden     Karnofsky Score: 70    PHYSICAL EXAM:    Physical Exam  Vitals signs reviewed.   Constitutional:       General: She is not in acute distress.  Pulmonary:      Breath sounds: Normal air entry.  PREVISIT INFORMATION                                                    Maurice MAGAÑA Negrito scheduled for future visit at [facility] specialty clinics.    Patient is scheduled to see Yovani on 06/05  Reason for visit: testicular pain  Referring provider:   Has patient seen previous specialist?   Medical Records:  Left message for pt to return call to clinic    REVIEW                                                      New patient packet mailed to patient: No  Medication reconciliation complete: No      PLAN/FOLLOW-UP NEEDED                                                      Patient Reminders Given:    Informed patient to bring an updated list of allergies, medications, pharmacy details and insurance information. Directed patient to come to the 2nd floor, check-in #4 for their appointment. Informed patient to call back if appointment needs to be cancelled or rescheduled at [phone].    Reminded patient to bring any outside records regarding this appointment or have them faxed to clinic at [phone].    Reminded patient to complete and bring in urology questionnaire. Also for patient to please come with a full bladder and to ask , if early to get staff member for sample.         Skin:     Findings: No erythema.   Neurological:      Mental Status: She is alert.          Toxicity Assessment 7/22/2020 7/16/2020 7/15/2020 7/10/2020 7/8/2020   Toxicity Assessment Chest Chest Chest Chest Chest   Fatigue (lethargy, malaise, asthenia) Moderate (e.g., decrease in performance status by 1 ECOG level or 20% Karnofsky) or causing difficulty performing some activities Increased fatigue over baseline, but not altering normal activities Increased fatigue over baseline, but not altering normal activities Moderate (e.g., decrease in performance status by 1 ECOG level or 20% Karnofsky) or causing difficulty performing some activities Increased fatigue over baseline, but not altering normal activities   Radiation Dermatitis None None None None None   Anorexia Oral intake significantly decreased Oral intake significantly decreased Oral intake significantly decreased Oral intake significantly decreased Oral intake significantly decreased   Dyspepsia/heartburn None None None Mild None   Dysphagia, Esophagitis, odynophagoa (painful swallowing) None None None Mild dysphagia, but can eat regular diet None   RT Dysphagia-Esophageal None None None Mild dysphagia, but can eat regular diet None   Cough Absent Absent Absent Absent Absent   Dyspnea Normal Normal Normal Normal Normal       CURRENT MEDICATIONS:    Current Outpatient Medications:   •  zinc sulfate (ZINCATE) 220 (50 Zn) MG Cap, Take 220 mg by mouth every day., Disp: , Rfl:   •  lidocaine-prilocaine (EMLA) 2.5-2.5 % Cream, APPLY A SMALL AMOUNT OF CREAM TO PORT SITE AN HOUR PRIOR TO TREATMENT, Disp: , Rfl:   •  nicotine (NICODERM) 21 MG/24HR PATCH 24 HR, APPLY ONE PATCH TOPICALLY TO CLEAN DRY SKIN EVERY 24 HOURS, Disp: , Rfl:   •  ondansetron (ZOFRAN) 8 MG Tab, TAKE 1 TABLET BY MOUTH TWICE DAILY AS NEEDED FOR NAUSEA, Disp: , Rfl:   •  prochlorperazine (COMPAZINE) 10 MG Tab, TAKE 1 TABLET BY MOUTH EVERY 8 HOURS AS NEEDED FOR NAUSEA, Disp: , Rfl:   •   Cholecalciferol (VITAMIN D) 50 MCG (2000 UT) Cap, Take 4,000 Units by mouth every day., Disp: , Rfl:   •  Pseudoeph-Doxylamine-DM-APAP (NYQUIL PO), Take  by mouth as needed. Takes prn for sleep approx twice a month, Disp: , Rfl:   •  atorvastatin (LIPITOR) 10 MG Tab, Take 10 mg by mouth every day. Takes in the afternoon, Disp: , Rfl:   •  denosumab (PROLIA) 60 MG/ML Solution Prefilled Syringe, Inject 60 mg as instructed Once. Twice a year, Disp: , Rfl:   •  anastrozole (ARIMIDEX) 1 MG TABS, Take 1 mg by mouth every day. Takes in the afternoon, Disp: , Rfl:     LABORATORY DATA:   Lab Results   Component Value Date/Time    SODIUM 138 05/16/2020 08:57 AM    POTASSIUM 3.8 05/16/2020 08:57 AM    CHLORIDE 102 05/16/2020 08:57 AM    CO2 20 05/16/2020 08:57 AM    GLUCOSE 93 05/16/2020 08:57 AM    BUN 13 05/16/2020 08:57 AM    CREATININE 0.75 05/16/2020 08:57 AM         Lab Results   Component Value Date/Time    WBC 7.2 07/27/2017 12:35 PM    HEMOGLOBIN 15.7 07/27/2017 12:35 PM    HEMATOCRIT 47.0 07/27/2017 12:35 PM    MCV 95.1 07/27/2017 12:35 PM    PLATELETCT 199 07/27/2017 12:35 PM        RADIOLOGY DATA:  No results found.    IMPRESSION:  Small cell lung cancer (HCC)  Staging form: Lung, AJCC 8th Edition  - Clinical stage from 5/18/2020: Stage IIB (cT1c, cN1, cM0) - Signed by William Cruz M.D. on 5/18/2020  Type of lung cancer: Small cell lung cancer      PLAN:  No change in treatment plan    Disposition:  Treatment plan reviewed. Questions answered. Continue therapy outlined.     William Cruz M.D.    Orders Placed This Encounter   • Rad Onc Aria Session Summary   • zinc sulfate (ZINCATE) 220 (50 Zn) MG Cap

## 2021-05-31 NOTE — PROGRESS NOTES
"Speech Language/Pathology  Videofluoroscopic Swallow Study     Problem:  Patient Active Problem List   Diagnosis     Dehydration     Constipation, unspecified constipation type     Malignant neoplasm of esophagus, unspecified location (H)     Leukopenia, unspecified type     Anoxia     Non-intractable vomiting with nausea, unspecified vomiting type     Intractable vomiting with nausea, unspecified vomiting type     Neutropenic fever (H)     Hypophosphatemia     Moderate malnutrition (H)     Esophageal cancer (H)       Onset Date: 8/22/2019 (order date)  Reason for Evaluation: Assess for oropharyngeal dysphagia  Pertinent History: As above. Esophageal cancer s/p radiation treatment and chemotherapy  Current Diet: Regular textures and thin liquids  Baseline Diet: Regular textures and thin liquids    Patient is 53-year-old male referred due to concerns of possible aspiration.  He is currently undergoing chemotherapy for esophageal cancer.  He reports that his cancer has also metastasized to his spine. Patient's primary concern today is the fact that he becomes very short of breath with any activity.  He states that none of his providers have been able to explain why this occurs.  He denies any coughing or choking with oral intake.  He does note that he has to \"keep chewing until the food becomes a paste\".  If he doesn't do this, the food tends to stick in his throat or chest.  Patient states that it has been difficult for him to maintain a healthy weight.  He previously had a J-tube and hopes to avoid getting one again.  The purpose of this study is to evaluate the physiology & function of patient's oropharyngeal swallow and determine his aspiration risk.    Patient presents as pleasant and cooperative during this evaluation. He arrived to appointment alone. He was tearful at times throughout.  An  was not applicable    Patient was observed with nectar-thick, thin, and pureed consistencies of barium, as well " as a regular solid (i.e., barium-coated nhi cracker).    Oral Phase:    Dentition/Oral hygiene: Patient's teeth are in good condition. Oral hygiene was also good.    Oral motor function was not impaired.   Lingual: WNL  Labial: WNL  Buccal: WNL    Bolus prep and oral control were not impaired. Piecemeal deglutition was observed with all consistencies. Mastication was safe and effective and the patient used rotary chewing.    Anterior-Posterior transit was not impaired.    Premature spill beyond the base of the tongue into the valleculae did not occur with any consistency.    Tongue base retraction was not impaired.    Oral stasis did not occur with any consistency.    Pharyngeal Phase:    Aspiration did not occur with any consistency.    Transient laryngeal penetration occurred intermittently with thin liquid. This was minimal with sips via cup, and slightly deeper with sips via straw. Penetrated material cleared spontaneously, with no evidence of any barium remaining in the laryngeal vestibule.    Swallow response was delayed with thin, nectar, and puree. This resulted in pourover past the epiglottis with puree, and to the pyriform sinuses with nectar and thin.    Epiglottic movement was complete consistently across texture trials, though delayed with thin and nectar-thick liquid. This appeared to contribute to laryngeal penetration with thin liquid.    Pharyngeal stasis did not occur with any consistency.    Pharyngeal constriction was not impaired.    Hyolaryngeal elevation was not impaired. Hyolaryngeal excursion was reduced.    Cricopharyngeal function was not impaired. Cervical esophageal function was not impaired. The upper esophagus has a very dilated appearance.    Assessment:    Patient demonstrated no significant oral dysphagia and mild pharyngeal dysphagia.    Patient is at slight risk for aspiration with thin liquids due to delayed timing of the swallow response and delayed epiglottic  inversion.    Recommendations:    Plan: Continue current diet of Regular textures and thin liquids as tolerated    Strategies: Patient to follow standard safe swallowing precautions, including sitting fully upright for all intake, eating slowly, and taking one small bite or sip at a time. Patient to also alternate 1-2 bites of food with 1-2 sips of liquid to aid in esophageal clearance.     Continue outpatient Speech Therapy per primary therapist's (Jesenia Burt MS, CCC-SLP) discretion.     Referrals: N/A    Reviewed history of swallow problem with patient and verbally explained roles of SLP and radiologist. Verbally explained process of VFSS prior to administration of barium. Verbally explained results and recommendations to patient. SLP answered questions and stated that a copy of this report will be faxed to patient's referring provider, Dr. Wilson of [facility]. Patient verbalized understanding.    30 dysphagia minutes    Caitlin Dunn MA, CCC-SLP

## 2021-06-01 VITALS — HEIGHT: 68 IN | WEIGHT: 175.2 LBS | BODY MASS INDEX: 26.55 KG/M2

## 2021-06-01 VITALS — HEIGHT: 68 IN | WEIGHT: 185 LBS | BODY MASS INDEX: 28.04 KG/M2

## 2021-06-02 VITALS — HEIGHT: 68 IN | BODY MASS INDEX: 23.13 KG/M2 | WEIGHT: 152.6 LBS

## 2021-06-02 VITALS — BODY MASS INDEX: 24.71 KG/M2 | WEIGHT: 163 LBS | HEIGHT: 68 IN

## 2021-06-16 PROBLEM — K59.00 CONSTIPATION, UNSPECIFIED CONSTIPATION TYPE: Status: ACTIVE | Noted: 2018-06-20

## 2021-06-16 PROBLEM — E86.0 DEHYDRATION: Status: ACTIVE | Noted: 2018-06-20

## 2021-06-16 PROBLEM — R11.2 INTRACTABLE VOMITING WITH NAUSEA, UNSPECIFIED VOMITING TYPE: Status: ACTIVE | Noted: 2018-06-21

## 2021-06-16 PROBLEM — C15.9 ESOPHAGEAL CANCER (H): Status: ACTIVE | Noted: 2018-06-20

## 2021-06-17 NOTE — PROCEDURES
Interventional Radiology Post-Procedure Note    Procedure: Chest port placement.    Attending: Rickey Lazar,     Complications: No immediate complications.    Findings: Placement of a chest port.  Please see the final report for specific details.    Plan: The chest port is ready for use.

## 2021-06-17 NOTE — H&P
"[facility] Radiology History and Physical Note  Date/Time: 4/20/2018/9:01 AM    Procedure Requested: Port placement  Requesting Provider: Dr. Lopez    HPI: Maurice Mahan is a 52 y.o. male with history of esophageal cancer -originally diagnosed in 2011 s/p esophagectomy and treated with radiation/chemotherapy via right port, which has since been removed. Patient had complained of increasingly difficulty swallowing over the last few months. He was found to have recurrent adenocarcinoma on biopsy 4/6/18. Patient will begin chemotherapy soon, followed by radiation. Presents today for port placement.     NPO Status: MN  Anticoagulation/Antiplatelets/Bleeding tendencies: None  Antibiotics: Ancef for procedure    REVIEW OF SYMPTOMS: Denies recent fevers, chills, night sweats, abdominal pain, N/V/D.    PAST MEDICAL HISTORY:   Past Medical History:   Diagnosis Date     Cancer      Hyperlipidemia        PAST SURGICAL HISTORY:   Past Surgical History:   Procedure Laterality Date     ESOPHAGUS SURGERY       PORTACATH PLACEMENT       ALLERGIES:  Review of patient's allergies indicates no known allergies.    MEDICATIONS:  No current outpatient prescriptions on file.     Current Facility-Administered Medications   Medication Dose Route Frequency Provider Last Rate Last Dose     ceFAZolin 2 g in 100 mL in D5W (ANCEF)  2 g Intravenous 30 Min Pre-Op Mp Do CNP         lidocaine 10 mg/mL (1 %) injection 0.1-0.3 mL  0.1-0.3 mL Subcutaneous PRN Mp Do CNP         sodium chloride flush 3 mL (NS)  3 mL Intravenous Line Care Mp Do CNP           LABS:  INR (no units)   Date Value   04/20/2018 0.93     Hemoglobin (g/dL)   Date Value   04/20/2018 14.2     Platelets (thou/uL)   Date Value   04/20/2018 346       EXAM:  /73  Pulse 75  Temp 98.9  F (37.2  C) (Oral)   Resp 18  Ht 5' 8\" (1.727 m)  Wt 185 lb (83.9 kg)  SpO2 98%  BMI 28.13 kg/m2  General: Stable. In no acute " distress.  Neuro: A&O x 3.   Resp: Lungs clear to auscultation bilaterally.  Cardio: S1S2 and reg, without murmur, clicks or rubs  Skin: Without excoriations, ecchymosis, erythema, lesions or open sores on abdomen.    Pre-Sedation Assessment:  Mallampati Airway Classification: Class 2: upper half of tonsil fossa visible  Previous reaction to anesthesia/sedation: no  Sedation plan based on assessment: Moderate  Sleep Apnea: no  Dentures: no  COPD: no  ASA Classification: ASA 2 - Patient with mild systemic disease with no functional limitations    ASSESSMENT: 52 year old male with recurrent esophageal cancer in need of long term IV access for chemotherapy    PLAN: RIGHT Port placement with sedation     The procedure, risks and moderate sedation were discussed with the patient, all questions answered and patient agrees to proceed with the procedure. Written consent obtained.    Natasha Hu, CNP    [facility]: Interventional Radiology   [phone]

## 2021-06-18 NOTE — ANESTHESIA CARE TRANSFER NOTE
Last vitals:   Vitals:    06/22/18 2223   BP: 113/58   Pulse: 100   Resp: 20   Temp: 36.4  C (97.5  F)   SpO2: 96%     Patient's level of consciousness is drowsy  Spontaneous respirations: yes  Maintains airway independently: yes  Dentition unchanged: yes  Oropharynx: oropharynx clear of all foreign objects    QCDR Measures:  ASA# 20 - Surgical Safety Checklist: WHO surgical safety checklist completed prior to induction  PQRS# 430 - Adult PONV Prevention: 4558F - Pt received => 2 anti-emetic agents (different classes) preop & intraop  ASA# 8 - Peds PONV Prevention: NA - Not pediatric patient, not GA or 2 or more risk factors NOT present  PQRS# 424 - Emiliana-op Temp Management: 4559F - At least one body temp DOCUMENTED => 35.5C or 95.9F within required timeframe  PQRS# 426 - PACU Transfer Protocol: - Transfer of care checklist used  ASA# 14 - Acute Post-op Pain: ASA14B - Patient did NOT experience pain >= 7 out of 10

## 2021-06-18 NOTE — ANESTHESIA PREPROCEDURE EVALUATION
Anesthesia Evaluation      Patient summary reviewed   No history of anesthetic complications     Airway   Mallampati: II  Neck ROM: full   Pulmonary - normal exam    breath sounds clear to auscultation  (+) a smoker (quit 2001)                         Cardiovascular - negative ROS and normal exam  (+) , hypercholesterolemia,      Neuro/Psych - negative ROS     Endo/Other - negative ROS      GI/Hepatic/Renal      Comments: Esophageal cancer     Other findings: Esophageal cancer--chemo/radiation therapy   Dehydration    Constipation, unspecified constipation type    Malignant neoplasm of esophagus, unspecified location (H)    Leukopenia, unspecified type    Anoxia    Non-intractable vomiting with nausea, unspecified vomiting type    Intractable vomiting with nausea, unspecified vomiting type    Neutropenic fever (H)    Hypophosphatemia    Moderate malnutrition (H)    Esophageal cancer (H)          Dental - normal exam   (+) caps                       Anesthesia Plan  Planned anesthetic: general endotracheal  Versed/fent/propofol/debbie  Decadron/zofran    Avoid dilaudid - patient prefers ANY other narcotic  ASA 3   Induction: intravenous   Anesthetic plan and risks discussed with: patient  Anesthesia plan special considerations: antiemetics,   Post-op plan: routine recovery        Chemistry        Component Value Date/Time     06/22/2018 0523    K 3.6 06/22/2018 0523     06/22/2018 0523    CO2 25 06/22/2018 0523    BUN 14 06/22/2018 0523    CREATININE 0.96 06/22/2018 0523     06/22/2018 0523        Component Value Date/Time    CALCIUM 8.5 06/22/2018 0523    ALKPHOS 64 06/22/2018 0523    AST 13 06/22/2018 0523    ALT 11 06/22/2018 0523    BILITOT 0.4 06/22/2018 0523        Lab Results   Component Value Date    WBC 3.3 (L) 06/22/2018    HGB 10.1 (L) 06/22/2018    HCT 29.5 (L) 06/22/2018    MCV 86 06/22/2018     06/22/2018     Lab Results   Component Value Date    INR 1.40 (H) 06/22/2018    INR  0.93 04/20/2018

## 2021-06-18 NOTE — ANESTHESIA POSTPROCEDURE EVALUATION
Patient: Maurice Staffordt  PLACEMENT, JEJUNOSTOMY TUBE  Anesthesia type: general    Patient location: PACU  Last vitals:   Vitals:    06/22/18 2250   BP: 105/61   Pulse: 73   Resp: 19   Temp:    SpO2: 92%     Post vital signs: stable  Level of consciousness: awake and responds to simple questions  Post-anesthesia pain: pain controlled  Post-anesthesia nausea and vomiting: no  Pulmonary: unassisted, spontaneous ventilation, nasal cannula  Cardiovascular: stable and blood pressure at baseline  Hydration: adequate  Anesthetic events: no    QCDR Measures:  ASA# 11 - Emiliana-op Cardiac Arrest: ASA11B - Patient did NOT experience unanticipated cardiac arrest  ASA# 12 - Emiliana-op Mortality Rate: ASA12B - Patient did NOT die  ASA# 13 - PACU Re-Intubation Rate: ASA13B - Patient did NOT require a new airway mgmt  ASA# 10 - Composite Anes Safety: ASA10A - No serious adverse event    Additional Notes:

## 2021-06-19 NOTE — PROGRESS NOTES
HPI: Pt is here for follow up J tube placement with Dr. Plummer on 6/22/2018.   he is doing well.  His tube is flushing well and he is tolerating some tube feeds        /67 (Patient Site: Right Arm, Patient Position: Sitting, Cuff Size: Adult Large)  Pulse 85  SpO2 97%    EXAM:  GENERAL:Appears well  ABDOMEN:  Soft, +BS  SURGICAL WOUNDS:  Incisions healing well, no enduration or drainage. Staples removed      Assessment/Plan: Doing well after surgery and should follow up as needed.    Samantha Jackson , [facility]

## 2021-06-20 NOTE — PROGRESS NOTES
52-year-old gentleman with esophageal cancer I placed a J-tube which is backing out with an pressure is applied to it.  This tube has come free from the places where it was sutured to the skin.  He is currently eating without use of the feeding tube.  His appetite is recently picked up and is been eating much better.  He is not certain he needs this feeding tube at all but his medical team and oncologic team have advised him to keep the tube for now.  From my standpoint it easy to keep this tube in and it easy to remove the tube once is no longer needed.  It would be difficult to replace this tube and for that reason I recommend we keep the tube in place until we are confident we have no further need for it.  I will just sutured this tube to the skin.    Procedure note   this area of skin was prepped with Betadine.  The regions around the flange of the J-tube where the sutures were to be placed were infused with 1% lidocaine with epinephrine.  3-0 Prolene sutures were used to suture the phalange of the J-tube down to the skin.  This was done with 3 separate interrupted sutures.

## 2021-06-22 NOTE — PROGRESS NOTES
Pt with a feeding tube that he has not used since Sept. For his esophageal CA.  He just received news that the CA is clear per and EUS and biopsies.  He wants his feeding tube out.    The Feeding J-Tube was removed in clinic today.    Gauze was placed over the open wound.    Follow Up as needed.    [facility]  [phone]

## 2022-01-11 NOTE — PROGRESS NOTES
Sepsis 2/2 Brazilian butt lift Service Date: 05/01/2020      MEDICAL ONCOLOGY NEW PATIENT VISIT      REFERRING PHYSICIAN:  Taylor Cook MD, Thoracic Oncology Service at the [facility]      PRIMARY ONCOLOGIST:  Haile Wilson MD, at [facility] in [place], [place].        The patient is kindly referred by Dr. Cook for discussion of clinical trials.        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 HER2 negative by IHC; PDL1 status unknown; MSI status unknown.     HISTORY OF PRESENT ILLNESS:  Mr. Maurice Mahan is a 54-year-old  gentleman who joins me via Doximity-based video visit today for a a third Medical Oncology opinion.  He is seeking a clinical trial discussion.  His wife accompanies him on this visit via Doximity visit that occurred between 10:45 a.m. and 11:12 a.m. today, Friday, 05/01/2020.  He was kindly referred by Dr. Taylor Cook.      Briefly, the patient was initially diagnosed with a localized form of clinical stage [id number], metastatic GE junction adenocarcinoma back in 2011.  He was diagnosed precisely on 06/08/2011 and underwent a PET/CT that showed nonmetastatic disease at the time.  He was treated with concurrent chemoradiation of cisplatin and 5-FU for 2 cycles between 06/13-07/11/2011 before undergoing a robotic esophagectomy and jejunostomy on 09/01/2011.  On histopathologic examination, he reportedly had had a complete pathologic CR to upfront therapy.      He lived the next few years undergoing active surveillance until he had a recurrence in mid 2018.  On 04/06/2018, he had an EUS that showed there was evidence of histologic adenocarcinoma at the esophageal anastomosis and paraesophageal lymph nodes representing local/regional recurrence of his initial cancer from 7 years prior.  A PET/CT showed only the persistent paraesophageal lymph node, but no other evidence of distant metastatic disease at that  time.  He underwent again chemoradiation with cisplatin and 5-FU for an additional 4 cycles.  He underwent a feeding tube, and this was all between 05-07/2018.  He underwent a thoracentesis 08/08/2018 with cytology negative for malignancy that was suspected clinically apparently to be malignant in origin at that time.  He developed a DVT in his left lower extremity 08/15/2018 and underwent anticoagulation.      He underwent an EUS 11/27/2018 that showed unclear findings, but he did undergo a bone scan 02/20/2019 that showed a C2 lesion that was biopsied and confirmatory of a second recurrence of GE junction adenocarcinoma.  The HER-2 testing at that time was reportedly negative by IHC.  He underwent an MRI that revealed further multifocal lesions in the C2, C4, C5 and T2 areas.  He underwent a C1-4 fusion by an outside spine surgeon on 04/05/2019, and intraoperative assessment confirmed further adenocarcinoma with signet ring features.  Again, testing was negative for HER-2 by IHC.      He underwent systemic palliative-intent chemotherapy.  This would be considered the first-line chemotherapy given with palliative intent with 6 cycles of FOLFIRI between 04/24-07/03/2019.  Subsequent to that, presumably due to progression, he underwent ramucirumab for a couple months before CyberKnife treatment to C5 over 3 fractions beginning 10/30/2019.  His third-line treatment of systemic therapy consisted of trifluridine/tipiracil.  He underwent further biopsies in 12/2019 at T6 and T9 to obtain pathologic tumor specimen to test for PD-L1.  He tells me today, as does his wife, that unfortunately due to the nature of the bone matrix, there was not enough tumor to assess for PD-L1 status.      The patient subsequently underwent another form of treatment that is not clear, potentially Lonsurf.  At that time he requested a second opinion with Dr. Cook at our center 01/21/2020.  Subsequent to that, because the PD-L1 could not be  tested, his oncologist obtained pembrolizumab, also called Keytruda, through the company for compassionate use without knowing the status of PD-L1.      The patient thus far has received 3 infusions of this on an every 3 week basis, the last of which was administered 3 days ago.  He tells me today that the plan is to go forward with a fourth infusion and then obtain scans to determine what the next plan forward would be.      Today he and his wife mostly asked about clinical trials here and elsewhere and the process for investigating clinical trials, and his wife cites very specifically [facility] and [facility] and how to navigate the system.  I looked in Care Everywhere, and there was some indication that the patient had been referred to hospice.      Today the patient clarifies for me that although there was some form of consultation to investigate the possibility around 04/23, he has not enrolled in hospice, and he is, in fact, on cancer-directed therapy; specifically, the compassionate-use form of pembrolizumab.  He continues to have pain in the side, for which he saw his primary care physician in the last few weeks, who deferred to the Oncology team.  He last discussed his situation with his oncologist, Dr. Wilson, in recent weeks.      PAST MEDICAL HISTORY:  The patient has metastatic esophageal/GE junction adenocarcinoma that is HER-2 negative as noted above.  PD-L1 status could not be ascertained due to poor tumor content on multiple biopsies of the spine lesions in 12/2019.  He had a left lower extremity DVT in 08/2018 on anticoagulation with IVC filter that was removed 07/15/2019.  Per the outside notes, he also has a history of AV block.  He does have hyperlipidemia, small bowel obstructions, herniation of disk.  In terms of surgery, he had a spermatocele excision in 2010.  He had the spinal fusions noted above about 04/2019.  Otherwise, the only part of the  medical history that is otherwise remarkable is that he had some cardiomyopathy felt to be due to chemotherapy with an EF of 45% reported in 2018.      FAMILY HISTORY:  Not discussed in depth today.  He does not endorse any notable family history of malignancy that would be pertinent to the discussion today.      SOCIAL HISTORY:  He lives in the [facility] area.  His wife joins in by phone.  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  Tobacco:  He is a former smoker, quit around 2002.  Alcohol:  He reportedly has a history of alcohol abuse, but sober since 1996.  He has 2 stepsons, ages 14 and 21.  Drugs:  He denies illicit drug use.        MEDICATIONS:  Fully reviewed in Epic.   Current Outpatient Medications   Medication     atorvastatin (LIPITOR) 20 MG tablet     ciprofloxacin (CIPRO) 500 MG tablet     Ferrous Sulfate 324 (65 Fe) MG TBEC     LORazepam (ATIVAN) 1 MG tablet     metoprolol succinate ER (TOPROL-XL) 25 MG 24 hr tablet     metoprolol succinate ER (TOPROL-XL) 25 MG 24 hr tablet     pantoprazole (PROTONIX) 20 MG EC tablet     senna (SENNA-TIME) 8.6 MG tablet     tamsulosin (FLOMAX) 0.4 MG capsule     traZODone (DESYREL) 50 MG tablet     No current facility-administered medications for this visit.           ALLERGIES:  Fully reviewed in Epic.      Allergies   Allergen Reactions     Fentanyl Nausea and Vomiting     Reports getting very sick        PHYSICAL EXAMINATION:  Could not be performed today due to the nature of the virtual video visit.   Physical exam could not be performed today in context of a Virtual Visit during the COVID19/Coronavirus pandemic.    Observed physical assessments made today by visualizing the patient by video link:    General/Constitutional: Generally appears gaunt and and somewhat chronically ill appearing, but not acutely ill.  HEENT: no scleral  icterus, not evidently jaundiced.  Respiratory: no labored breathing.  Musculoskeletal: appears to have full range of motion and adequate physical strength.  Skin: no jaundice, discoloration or other notable lesions.  Neurological: no evidence of tremors.  Psychiatric: no evident anxiety; fully alert and oriented with fluent speech.      The rest of a comprehensive physical examination is deferred due to PHE (public health emergency) video visit restrictions.    Labs/Pathology/Imaging:     I personally reviewed prior to the visit any available pathology, laboratory and pertinent imaging studies and information obtained by Dr. Cook for her consultation here at the [facility] on 01/20/2020.  I summarized them in lay terms and reviewed overall, most specifically focusing on the status of HER2 and PD-L1, whether known or unknown, and discussed it in the framework and the context of a clinical trial and discussion.      IMPRESSION/PLAN:  Mr. Maurice Mahan is a 54-year-old  gentleman who initially was diagnosed with localized GE junction/lower esophageal carcinoma in 2011.  He underwent localized therapy, including neoadjuvant-intent chemoradiation followed by surgery.  He had a local recurrence with a node in 2018 that was biopsy proven, but unfortunately developed widely metastatic disease that was mainly focused and multifocal in his spine and also the cranium.  There was no specific node or evidence of parenchymal disease with metastasis to the liver, the chest or elsewhere systemically aside from the bone.  His tumor burden is relatively difficult to ascertain or measure per se.  From my review of the outside genomic evaluation per IHC, his tumor was tested negative for HER2 multiple times, and unfortunately attempts to biopsy some of the smaller spinal metastases in 12/2019 did not yield adequate tumor content for assessing PD-L1.  Nonetheless, his oncologist did obtain compassionate-use  "worth of pembrolizumab, which he is currently doing and the results of which will not be known until May when he gets his next scans.        I briefly reviewed the natural course, biology, diagnosis and treatment of gastroesophageal carcinoma.  The patient's principal question and reason for requesting a consultation today is to know, \"Have I exhausted all available treatments,\" for his cancer.  I provided reassurance that he indeed has utilized much of the standard of care.  I am not sure whether the records I am viewing are relatively limited.  I do not see that he received paclitaxel, but he has received specifically cisplatin twice concurrent with chemoradiation.      His wife did indicate that his oncologist had offered some form of combination therapy, whether 2-3 doses in combination.  When I later stated the acronym FOLFOX, his wife said that that was the combination regimen that his oncologist will offer subsequent to progression while on the pembrolizumab.  Again, while I do not see paclitaxel on here, I questioned whether or not with the patient's advancing extensive tumor with significant symptoms worsening over the last several weeks to months and the possibility as a hospice candidate, whether or not the risk of paclitaxel would be balanced out by any potential benefit in a multiple refractory setting.      He has already received ramucirumab and trifluridine/tipiracil, and the relatively quick progression of disease on those agents, which are utilized for upper GI malignancies, would lead one to suspect that he would likely have little yield versus the potential risks and side effects of paclitaxel use.      Otherwise, I encouraged the patient to communicate with an oncologist as to whether or not he would want to request an earlier scan in light of the worsening symptoms in the last week or 2.  Otherwise, if the patient proceeds with the next intended fourth and final scheduled pembrolizumab in 3 " weeks' time and is scanned subsequent to that, he will find out in May whether or not that medication has any utility.  I did explain to him especially with the prior history in 2011 of the PD-L1 testing showing 0% expression at that time, while it is conceivable that with the tumor heterogeneity the result would be different due to tumor evolution over the past decade, it would not be likely, and certainly if he does not have any PD-L1 expression in his current form of tumor, then the pembrolizumab being efficacious would be extremely unlikely.  He does state clear understanding of that.      His wife asked in more depth about clinical trials.  I stated that overall due to the coronavirus pandemic, many of our clinical trials are on pause, but as I reviewed basic principles of performance status, he did not indicate to me that he was able to walk long distances or to do ADLs entirely of his own accord, and though while I am not able to assess him in person, I do not think he would meet adequate performance status criteria for safety parameters and eligibility for clinical trials.  In addition, logistically before the current coronavirus pandemic, some of the clinical trials he had been looking at, including our FATE/natural killer cell trials, were either in transit or potentially were opening in the coming months or limited in terms of available spots.  That was certainly the case at that time in 01/2020 when the patient met with Dr. Coko and is even more acute now due to the coronavirus pandemic and clinical trial pause.      I reviewed all the above in great detail.  When his wife subsequently asked whether or not they should go to [facility] or [facility], I encouraged them to speak with their oncologist and stated that it is truly up to them, but obviously it would require extensive travel to [place] and [place], [place], respectively, and if the patient is not  able to make the travel on a frequent basis, even if a clinical trial were offered, then that would be prohibitive as well, but I suggested they have a followup discussion regarding that point with their primary oncologist.      Otherwise, I answered their questions to the best of my ability, and they thanked me for the call, which again lasted from 10:45 to 11:12 a.m. with the patient in his home joined by his wife and me in my academic office at the [facility].  The call lasted approximately 27 minutes reviewing his entire medical history, focusing on Oncology treatment and discussion as above.      Thank you very much, Dr. Cook, for the kind referral.     I have reviewed the note as documented above. This accurately captures the substance of my conversation with the patient.    Date of call: May 1, 2020   Start of call: 10:45 am  End of call: 11:12 am    Provider location: [facility] (academic office)  Patient location: Home; his wife joined the call.    Mode of Video Visit: Doximity           cc:   Haile Wilson MD   [facility]    [address] [place] [address]      Taylor Cook MD   [facility] Thoracic Oncology Service   [address] [phone]         YAMILKA REECE MD             D: 2020   T: 2020   MT: peyton      Name:     JENIFER HAYS   MRN:      [id number]        Account:      [id number]   :      1965           Service Date: 2020      Document: [id number]